# Patient Record
(demographics unavailable — no encounter records)

---

## 2025-03-10 NOTE — HISTORY OF PRESENT ILLNESS
[FreeTextEntry1] : 84 y/o male here for f/u uroflow & TRUS to check for improvement after doing a 6 mos. course of Dutasteride in combination with his Tamsulosin. Last seen on 06/20/2024 for TRUS + cystoscopy to complete BPH diagnostics. At last visit c/o nocturia x5, frequency and urgency despite being on Tamsulosin. Eats citrus.  Pt states that currently  Denies hematuria, dysuria, flank pain, or any other urinary issues ANTIONETTE.  Occasionally eating spicy, citrus, chocolate. Adequately hydrating   FHx of PCA:   Tobacco use:   Last PSA: n/a (no hx) Last creatinine: n/a (no hx in chart) Last UCx: negative (05/30/2024) Uro meds: Tamsuolsin 0.4 mg x2 daily (Finished Dutasteride 6 mos course completed 12/20/2024)  06/20/2024 Cystoscopy - Bladder wall demonstrates a grade 1 trabeculation. Prostate Volume: 112 cc Length : 74 mm Width : 63 mm Height : 46 mm Impression: enlarged gland  06/20/2024 TRUS - The Bladder was evaluated in both axial and transverse planes, utilizing the BK Flex Focus with a 2.5 to 5 mHz curved array transducer. Bladder wall: was of normal thickness without trabeculation or diverticuli. Post void 15 cc. Seminal vesicles were visualized transabdominally and appear to be of normal size.

## 2025-03-31 NOTE — ASSESSMENT
[FreeTextEntry1] : 84 y/o male with BPH, here for f/u uroflow & TRUS. Nocturia decreased from x5 to x3, LUTS overall improved.  Admits eating citrus. Adequately hydrating  FHx of PCA: denies Tobacco use: denies Last PSA: n/a (no hx) Last creatinine: n/a (no hx in chart) Last UCx: negative (05/30/2024) Uro meds: Tamsuolsin 0.4 mg x2 daily (Finished Dutasteride 6 mos course completed 12/20/2024)  06/20/2024 - Cystoscopy  Bladder wall demonstrates a grade 1 trabeculation. Prostate Volume: 112 cc Length : 74 mm Width : 63 mm Height : 46 mm Impression: enlarged gland  06/20/2024 - TRUS Prostate Volume: 112 cc Length : 74 mm Width : 63 mm Height : 46 mm Impression: enlarged gland  03/31/2025 - TRUS Prostate Volume: 100 cc Length : 58 mm Width : 56 mm Height : 60 mm Impression: Patient stated pain with TRUS, TA performed. prostate gland still enlarged, but has decreased in size from prior imaging  the patient has umbilical hernia. Being referred to Gen Surg for appt. In consideration of the improvement of symptoms, the patient will continue with the current meds for other 6 months. Will F/U in 6 months with new TRUS.

## 2025-03-31 NOTE — HISTORY OF PRESENT ILLNESS
[FreeTextEntry1] : 86 y/o male with BPH, here for f/u uroflow & TRUS. Nocturia decreased from x5 to x3, LUTS overall improved.  Admits eating citrus. Adequately hydrating  FHx of PCA: denies Tobacco use: denies Last PSA: n/a (no hx) Last creatinine: n/a (no hx in chart) Last UCx: negative (05/30/2024) Uro meds: Tamsuolsin 0.4 mg x2 daily (Finished Dutasteride 6 mos course completed 12/20/2024)  06/20/2024 - Cystoscopy  Bladder wall demonstrates a grade 1 trabeculation. Prostate Volume: 112 cc Length : 74 mm Width : 63 mm Height : 46 mm Impression: enlarged gland  06/20/2024 - TRUS Prostate Volume: 112 cc Length : 74 mm Width : 63 mm Height : 46 mm Impression: enlarged gland

## 2025-05-09 NOTE — HISTORY OF PRESENT ILLNESS
[de-identified] : Mr. Camejo is an 85 year old male h/o CVA x 2 who presents today with his wife c/o enlarging umbilical hernia for several months. Patient denies associated pain. Tolerates PO. Reports normal BMs. Notes that when he is bloated, the hernia protrudes.

## 2025-05-09 NOTE — PLAN
[FreeTextEntry1] : 86 yo male with umbilical hernia I discussed surgical repair vs watchful waiting with patient and his wife. We discussed the benefits and risks of both options.  Patient and his wife do not wish to proceed with any surgical intervention at this time.  I offered patient to f/u with us in about 6 months if he wishes to revisit possibly pursuing surgery.  Should patient develop pain, inability to reduce hernia, overlying skin changes, obstructive symptoms, he should go to the ER, which he said he would. All of patient's questions answered to his apparent satisfaction.

## 2025-05-09 NOTE — PHYSICAL EXAM
[Calm] : calm [de-identified] : A&Ox3, NAD [de-identified] : Respirations nonlabored [de-identified] : Soft, NTND. Old PEG scar noted. Soft, reducible umbilical hernia. Nontender, no overlying skin changes